# Patient Record
Sex: FEMALE | Race: WHITE | Employment: STUDENT | URBAN - METROPOLITAN AREA
[De-identification: names, ages, dates, MRNs, and addresses within clinical notes are randomized per-mention and may not be internally consistent; named-entity substitution may affect disease eponyms.]

---
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
## 2023-01-30 ENCOUNTER — HOSPITAL ENCOUNTER (EMERGENCY)
Age: 23
Discharge: HOME | End: 2023-01-30
Payer: COMMERCIAL

## 2023-01-30 ENCOUNTER — APPOINTMENT (OUTPATIENT)
Dept: GENERAL RADIOLOGY | Age: 23
End: 2023-01-30
Payer: COMMERCIAL

## 2023-01-30 VITALS
BODY MASS INDEX: 26.05 KG/M2 | WEIGHT: 162.1 LBS | DIASTOLIC BLOOD PRESSURE: 73 MMHG | HEART RATE: 65 BPM | TEMPERATURE: 98.2 F | HEIGHT: 66 IN | OXYGEN SATURATION: 99 % | RESPIRATION RATE: 14 BRPM | SYSTOLIC BLOOD PRESSURE: 108 MMHG

## 2023-01-30 DIAGNOSIS — S62.351A NONDISPLACED FRACTURE OF SHAFT OF SECOND METACARPAL BONE, LEFT HAND, INITIAL ENCOUNTER FOR CLOSED FRACTURE: Primary | ICD-10-CM

## 2023-01-30 PROCEDURE — 99283 EMERGENCY DEPT VISIT LOW MDM: CPT

## 2023-01-30 PROCEDURE — 29125 APPL SHORT ARM SPLINT STATIC: CPT

## 2023-01-30 PROCEDURE — 73130 X-RAY EXAM OF HAND: CPT

## 2023-01-30 ASSESSMENT — PAIN - FUNCTIONAL ASSESSMENT
PAIN_FUNCTIONAL_ASSESSMENT: 0-10
PAIN_FUNCTIONAL_ASSESSMENT: 0-10

## 2023-01-30 ASSESSMENT — PAIN SCALES - GENERAL
PAINLEVEL_OUTOF10: 4
PAINLEVEL_OUTOF10: 7

## 2023-01-30 ASSESSMENT — PAIN DESCRIPTION - LOCATION: LOCATION: HAND

## 2023-01-30 ASSESSMENT — PAIN DESCRIPTION - DESCRIPTORS: DESCRIPTORS: DISCOMFORT

## 2023-01-30 ASSESSMENT — PAIN DESCRIPTION - ORIENTATION: ORIENTATION: LEFT

## 2023-01-31 NOTE — ED PROVIDER NOTES
[address] [address] ENCOUNTER        Pt Name: Shanta Martin  MRN: [id number]  [place] 2000  Date of evaluation: 1/30/2023  Provider: Renee Pretty PA-C  PCP: ELIA JIN [facility] [address]  Note Started: 7:45 PM EST 1/30/23      MARINA. I have evaluated this patient. My supervising physician was available for consultation. CHIEF COMPLAINT       Chief Complaint   Patient presents with    Hand Injury     Patient ambulatory to room 10 with complaint of left hand injury. Bearl Clam down the steps last night. Denies LOC and didn't hit head. CPTA:ibuprofen 800 mg last night       HISTORY OF PRESENT ILLNESS: 1 or more Elements     History From: Patient and mother    Limitations to history : None    Shanta Martin is a 25 y.o. female who presents to the emergency department with complaint of pain secondary to trip and fall on stairs occurring midnight this date. The patient is with her mother. They were watching the [employer] game and the patient was drinking. She does report descending stairs and they are about 3-4 stairs to go when she tripped and fell landing on the nondominant left hand with pain and swelling. At 12:30 AM this date the patient did take ibuprofen 800 mg. She has had no additional ibuprofen or Tylenol since the injury occurrence. There is some swelling. She has had no prior fracture dislocation involving the nondominant left hand. No other injuries related to the incident occurring at midnight this day. Nursing Notes were all reviewed and agreed with or any disagreements were addressed in the HPI. REVIEW OF SYSTEMS :      Review of Systems    Positives and Pertinent negatives as per HPI. SURGICAL HISTORY   No past surgical history on file. CURRENTMEDICATIONS       Previous Medications    ALBUTEROL (PROVENTIL) (2.5 MG/3ML) 0.083% NEBULIZER SOLUTION    Take 2.5 mg by nebulization every 6 hours as needed for Wheezing. ALBUTEROL SULFATE  (90 BASE) MCG/ACT INHALER    Inhale 2 puffs into the lungs every 6 hours as needed for Wheezing       ALLERGIES     Patient has no known allergies. FAMILYHISTORY     No family history on file. SOCIAL HISTORY       Social History     Tobacco Use    Smoking status: Passive Smoke Exposure - Never Smoker    Smokeless tobacco: Never   Substance Use Topics    Alcohol use: No    Drug use: No       SCREENINGS        [place] Coma Scale  Eye Opening: Spontaneous  Best Verbal Response: Oriented  Best Motor Response: Obeys commands  [place] Coma Scale Score: 15                CIWA Assessment  BP: 105/75  Heart Rate: 66           PHYSICAL EXAM  1 or more Elements     ED Triage Vitals [01/30/23 1937]   BP Temp Temp Source Heart Rate Resp SpO2 Height Weight   105/75 98.2 °F (36.8 °C) Oral 66 10 100 % 5' 6\" (1.676 m) 162 lb 1.6 oz (73.5 kg)       Physical Exam  Vitals and nursing note reviewed. Constitutional:       Appearance: Normal appearance. She is well-developed and normal weight. HENT:      Head: Normocephalic and atraumatic. Right Ear: External ear normal.      Left Ear: External ear normal.   Eyes:      General: No scleral icterus. Right eye: No discharge. Left eye: No discharge. Conjunctiva/sclera: Conjunctivae normal.   Cardiovascular:      Rate and Rhythm: Normal rate. Pulmonary:      Effort: Pulmonary effort is normal.   Abdominal:      General: Abdomen is flat. Bowel sounds are normal.      Palpations: Abdomen is soft. Tenderness: There is no abdominal tenderness. Musculoskeletal:         General: Swelling and tenderness present. Normal range of motion. Cervical back: Normal range of motion and neck supple. Right lower leg: No edema. Left lower leg: No edema. Comments: The patient exhibits no rotational deformity. She has brisk numbers refill all digits. Sensory intact all fingertips.   She has tenderness along the shaft of the left fourth metacarpal.  She has no pain about the wrist nor any of the forearm. She has no phalangeal tenderness. Skin:     General: Skin is warm and dry. Neurological:      General: No focal deficit present. Mental Status: She is alert and oriented to person, place, and time. Mental status is at baseline. Psychiatric:         Mood and Affect: Mood normal.         Behavior: Behavior normal.         Thought Content: Thought content normal.         Judgment: Judgment normal.         DIAGNOSTIC RESULTS   LABS:    Labs Reviewed - No data to display    When ordered only abnormal lab results are displayed. All other labs were within normal range or not returned as of this dictation. EKG: When ordered, EKG's are interpreted by the Emergency Department Physician in the absence of a cardiologist.  Please see their note for interpretation of EKG. RADIOLOGY:   Non-plain film images such as CT, Ultrasound and MRI are read by the radiologist. Plain radiographic images are visualized and preliminarily interpreted by the ED Provider with the below findings:    X-ray the patient's left hand is viewed by myself and interpreted by the radiologist shows an oblique fracture involving the left fourth metacarpal.    Interpretation per the Radiologist below, if available at the time of this note:    XR HAND LEFT (MIN 3 VIEWS)   Final Result      1. Nondisplaced oblique fracture fourth metacarpal shaft. No results found. No results found. PROCEDURES     A volar splint is applied by staff. Inspection by myself reveals appropriate placement without neurovascular compromise. Procedures    CRITICAL CARE TIME (.cctime)       PAST MEDICAL HISTORY      has a past medical history of Asthma and Chlamydia (01/24/2019).      EMERGENCY DEPARTMENT COURSE and DIFFERENTIAL DIAGNOSIS/MDM:   Vitals:    Vitals:    01/30/23 1937   BP: 105/75   Pulse: 66   Resp: 10   Temp: 98.2 °F (36.8 °C)   TempSrc: Oral   SpO2: 100% Weight: 162 lb 1.6 oz (73.5 kg)   Height: 5' 6\" (1.676 m)       Patient was given the following medications:  Medications - No data to display          Is this patient to be included in the SEP-1 Core Measure due to severe sepsis or septic shock? No   Exclusion criteria - the patient is NOT to be included for SEP-1 Core Measure due to: Infection is not suspected    Chronic Conditions affecting care: N/A   has a past medical history of Asthma and Chlamydia (01/24/2019). CONSULTS: (Who and What was discussed)  None      Social Determinants : None    Records Reviewed (Source): N/A    CC/HPI Summary, DDx, ED Course, and Reassessment: Patient presenting with history of tripped on stairs falling down 3 or 4 stairs. This resulted in injury to the nondominant left hand. Clinically she has swelling and tenderness over the fourth metacarpal.  X-ray confirms an oblique fracture involving the shaft of the bone. Splint applied. The patient will be discharged with orthopedic consult and referral.  She will take ibuprofen 600 mg every 6 or 8 hours for primary pain and inflammation control. She may add Tylenol 1000 mg every 6 or 8 hours for additional pain control. Ice elevation recommended. I did indicate she could take the splint off for bathing and reapply however I prefer to leave it in place at this time. The patient and mother have expressed understanding of her diagnosis and the treatment plan. Disposition Considerations (tests considered but not done, Admit vs D/C, Shared Decision Making, Pt Expectation of Test or Tx.): Patient be discharged to home with diagnosis acute oblique fracture nondominant left hand fourth metacarpal.  This is a shaft oblique fracture. Splint applied. She will use anti-inflammatory and pain control at home. Patient referred to orthopedics for further evaluation and treatment. I am the Primary Clinician of Record. FINAL IMPRESSION      1.  Nondisplaced fracture of shaft of second metacarpal bone, left hand, initial encounter for closed fracture          DISPOSITION/PLAN     DISPOSITION Decision To Discharge 01/30/2023 08:42:54 PM      PATIENT REFERRED TO:  Jaqueline Gardner MD  [facility]    Schedule an appointment as soon as possible for a visit in 3 days      [facility] Emergency Department  [address] [place] [address]  [place] [address]  Go to   If symptoms worsen    DISCHARGE MEDICATIONS:  New Prescriptions    No medications on file       DISCONTINUED MEDICATIONS:  Discontinued Medications    PRENATAL VIT-FE FUMARATE-FA (PRENATAL VITAMIN) 28-0.8 MG TABS TABLET    Take 1 tablet by mouth daily              (Please note that portions of this note were completed with a voice recognition program.  Efforts were made to edit the dictations but occasionally words are mis-transcribed. )    Kate Khan PA-C (electronically signed)        Kate Khan PA-C  01/30/23 2046

## 2023-01-31 NOTE — ED TRIAGE NOTES
Patient ambulatory to room 10 with complaint of left hand injury. Desire Chock down the steps last night. Denies LOC and didn't hit head. CPTA:ibuprofen 800 mg last night

## 2023-01-31 NOTE — DISCHARGE INSTRUCTIONS
You have sustained an oblique fracture of the left fourth metacarpal.  Splint applied. You may remove splint for bathing if necessary however would rather you not remove the splint. I do recommend ibuprofen 600 mg every 6 or 8 hours for primary pain and inflammation control. You may add Tylenol 1000 mg every 6 or 8 hours for additional pain control. Elevate and apply ice. Contact orthopedic tomorrow for an appointment on Wednesday or Thursday of this week.